# Patient Record
Sex: MALE | Race: WHITE
[De-identification: names, ages, dates, MRNs, and addresses within clinical notes are randomized per-mention and may not be internally consistent; named-entity substitution may affect disease eponyms.]

---

## 2024-01-02 ENCOUNTER — APPOINTMENT (OUTPATIENT)
Dept: ORTHOPEDIC SURGERY | Facility: CLINIC | Age: 24
End: 2024-01-02
Payer: COMMERCIAL

## 2024-01-02 PROBLEM — Z00.00 ENCOUNTER FOR PREVENTIVE HEALTH EXAMINATION: Status: ACTIVE | Noted: 2024-01-02

## 2024-01-02 PROCEDURE — 99203 OFFICE O/P NEW LOW 30 MIN: CPT

## 2024-01-02 PROCEDURE — 73130 X-RAY EXAM OF HAND: CPT | Mod: LT

## 2024-01-02 NOTE — HISTORY OF PRESENT ILLNESS
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  Hx: none. [] : no [FreeTextEntry1] : LEFT hand  [FreeTextEntry5] : DANIEL capps [RHD] 23 year old male is here today c/o LEFT hand pain x 2 days after being struck by opponents elbow playing basketball. pain localized to base of index finger. denies prior injury to hand. c/o swelling and pain with making a fist.

## 2024-01-02 NOTE — IMAGING
[de-identified] : LEFT HAND skin intact. mild swelling of IF MPJ. TTP to IF radial MPJ. IF: good extension, flex 1cm to DPC. no scissoring. good flex/ext other digits. SILT to median, ulnar, radial distribution.  palpable radial pulse, brisk cap refill all digits. no triggering.  IF MPJ: + pain with flexion.   XRAYS OF LEFT HAND: small linear calcification at radial base of index finger proximal phalanx, may represent avulsion fracture.

## 2024-01-02 NOTE — ASSESSMENT
[FreeTextEntry1] : The condition was explained to the patient. Recommend a course of conservative treatment. I explained that sprains / ligament injuries can take up to a year for maximal healing. Discussed risk of persistent pain, stiffness, instability, post-traumatic arthritis. Anticipate 1 year for swelling to stabilize, may have permanent enlargement of joint. Patient expressed understanding and is in agreement with treatment plan.   - prescribed OT custom hand-based radial gutter splint, full time. applied joshua loop to IF/MF in the interim. - light activity - avoid pinching and heavy gripping/lifting.  F/u 3 weeks.

## 2024-01-23 ENCOUNTER — APPOINTMENT (OUTPATIENT)
Dept: ORTHOPEDIC SURGERY | Facility: CLINIC | Age: 24
End: 2024-01-23
Payer: COMMERCIAL

## 2024-01-23 PROCEDURE — 99213 OFFICE O/P EST LOW 20 MIN: CPT

## 2024-01-23 NOTE — ASSESSMENT
[FreeTextEntry1] : - wear joshua loop to IF/MF during the day, OT custom hand-based radial gutter splint for sleep. - encouraged HEP to make a fist to reduce stiffness. - light activity - avoid pinching and heavy gripping/lifting.  F/u 3 weeks.

## 2024-01-23 NOTE — IMAGING
[de-identified] : LEFT HAND skin intact. mild swelling of IF MPJ. non-TTP to IF radial MPJ. IF: good extension, flex 1cm to DPC. no scissoring. good flex/ext other digits. SILT to median, ulnar, radial distribution.  palpable radial pulse, brisk cap refill all digits. no triggering.  IF MPJ: + pain with flexion.

## 2024-01-23 NOTE — HISTORY OF PRESENT ILLNESS
[de-identified] : 1/23/24: f/u LEFT index finger radial MPJ sprain with avulsion fx. in OT custom hand-based radial gutter splint. pain better.  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  Hx: none. [FreeTextEntry1] : LEFT hand  [FreeTextEntry5] : DANIEL is here today to follow up on his LEFT hand. Patient has been using custom hand splint and notes improvement in pain since last visit.

## 2024-02-20 ENCOUNTER — APPOINTMENT (OUTPATIENT)
Dept: ORTHOPEDIC SURGERY | Facility: CLINIC | Age: 24
End: 2024-02-20
Payer: COMMERCIAL

## 2024-02-20 DIAGNOSIS — S63.651A SPRAIN OF METACARPOPHALANGEAL JOINT OF LEFT INDEX FINGER, INITIAL ENCOUNTER: ICD-10-CM

## 2024-02-20 DIAGNOSIS — S62.619A DISPLACED FRACTURE OF PROXIMAL PHALANX OF UNSPECIFIED FINGER, INITIAL ENCOUNTER FOR CLOSED FRACTURE: ICD-10-CM

## 2024-02-20 PROCEDURE — 99213 OFFICE O/P EST LOW 20 MIN: CPT

## 2024-02-20 NOTE — IMAGING
[de-identified] : LEFT HAND skin intact. mild swelling of IF MPJ. non-TTP to IF radial MPJ. IF: good extension, flex almost to DPC. no scissoring. good flex/ext other digits. SILT to median, ulnar, radial distribution.  palpable radial pulse, brisk cap refill all digits. no triggering.  IF MPJ: + min pain with flexion.

## 2024-02-20 NOTE — HISTORY OF PRESENT ILLNESS
[de-identified] : 2/20/24: 7 weeks s/p LEFT index finger radial MPJ sprain with avulsion fx from 12/31/23. in joshua loops during the day, OT custom radial gutter splint for sleep. pain better.  1/23/24: f/u LEFT index finger radial MPJ sprain with avulsion fx. in OT custom hand-based radial gutter splint. pain better.  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  Hx: none. [FreeTextEntry1] : LEFT hand  [FreeTextEntry5] : DANIEL is here today to follow up on his LEFT hand. pt states since last visit, pain decreased. wearing joshua loops in the day and custom splint at night.

## 2024-02-20 NOTE — ASSESSMENT
[FreeTextEntry1] : - wear buddy loop to IF/MF as needed for activity. - continue HEP to reduce finger stiffness. - gradually advance activity as pain allows. recommend waiting 3 months from DOI to resume sports.  F/u PRN.

## 2024-08-07 ENCOUNTER — APPOINTMENT (OUTPATIENT)
Dept: ORTHOPEDIC SURGERY | Facility: CLINIC | Age: 24
End: 2024-08-07

## 2024-08-07 PROBLEM — S43.52XA SPRAIN OF LEFT ACROMIOCLAVICULAR LIGAMENT, INITIAL ENCOUNTER: Status: ACTIVE | Noted: 2024-08-07

## 2024-08-07 PROBLEM — Z78.9 NO PERTINENT PAST MEDICAL HISTORY: Status: RESOLVED | Noted: 2024-08-07 | Resolved: 2024-08-07

## 2024-08-07 PROCEDURE — 73030 X-RAY EXAM OF SHOULDER: CPT | Mod: LT

## 2024-08-07 PROCEDURE — 99203 OFFICE O/P NEW LOW 30 MIN: CPT

## 2024-08-07 PROCEDURE — 73010 X-RAY EXAM OF SHOULDER BLADE: CPT | Mod: LT

## 2024-08-07 NOTE — IMAGING
[Left] : left shoulder [There are no fractures, subluxations or dislocations. No significant abnormalities are seen] : There are no fractures, subluxations or dislocations. No significant abnormalities are seen [de-identified] : L shoulder: - No obvious deformity or swelling - Pain over AC joint - No pain with palpation over anterior or posterior shoulder - Forward flexion to 180 degrees, External rotation to 20 degrees, Internal rotation to T12. Pain with max range - 5/5 strength with internal and external rotation. Pain with external rotation - Negative Empty can - Pain with impingement testing - Pain with Speeds - Pain with Cross arm - Distally neurovascularly intact     R shoulder: - No obvious deformity or swelling - No pain with palpation over AC joint, anterior or posterior shoulder - Forward flexion to 180 degrees, External rotation to 30 degrees, Internal rotation to T12 - 5/5 strength with internal and external rotation - Negative Empty can - Negative Speeds - Distally neurovascularly intact

## 2024-08-07 NOTE — IMAGING
[Left] : left shoulder [There are no fractures, subluxations or dislocations. No significant abnormalities are seen] : There are no fractures, subluxations or dislocations. No significant abnormalities are seen [de-identified] : L shoulder: - No obvious deformity or swelling - Pain over AC joint - No pain with palpation over anterior or posterior shoulder - Forward flexion to 180 degrees, External rotation to 20 degrees, Internal rotation to T12. Pain with max range - 5/5 strength with internal and external rotation. Pain with external rotation - Negative Empty can - Pain with impingement testing - Pain with Speeds - Pain with Cross arm - Distally neurovascularly intact     R shoulder: - No obvious deformity or swelling - No pain with palpation over AC joint, anterior or posterior shoulder - Forward flexion to 180 degrees, External rotation to 30 degrees, Internal rotation to T12 - 5/5 strength with internal and external rotation - Negative Empty can - Negative Speeds - Distally neurovascularly intact

## 2024-08-07 NOTE — ASSESSMENT
[FreeTextEntry1] : - HEP given - PT referral - Exercise modifications - Motrin, tylenol, ice as needed - Follow up in 4-6 weeks if not improving

## 2024-08-07 NOTE — HISTORY OF PRESENT ILLNESS
[5] : 5 [0] : 0 [Sharp] : sharp [Intermittent] : intermittent [Rest] : rest [Exercising] : exercising [de-identified] : 08/07/2024:  RUBÉN 24y.o m. patient is here for evaluation of left shoulder pain. RHD. 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 he states that the pain has been present for the last 2 days, he is unable to lift his arm over a 90 angle and having problems with ROM.  denies OTC, denies numbness/tingling as well as radiating pain. no pain with rest. No hx of shoulder problems.  pressing and rowing. 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

## 2024-08-07 NOTE — HISTORY OF PRESENT ILLNESS
[5] : 5 [0] : 0 [Sharp] : sharp [Intermittent] : intermittent [Rest] : rest [Exercising] : exercising [de-identified] : 08/07/2024:  RUBÉN 24y.o m. patient is here for evaluation of left shoulder pain. RHD. 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 he states that the pain has been present for the last 2 days, he is unable to lift his arm over a 90 angle and having problems with ROM.  denies OTC, denies numbness/tingling as well as radiating pain. no pain with rest. No hx of shoulder problems.  pressing and rowing. 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

## 2024-09-10 ENCOUNTER — APPOINTMENT (OUTPATIENT)
Dept: ORTHOPEDIC SURGERY | Facility: CLINIC | Age: 24
End: 2024-09-10

## 2025-06-02 ENCOUNTER — APPOINTMENT (OUTPATIENT)
Dept: ORTHOPEDIC SURGERY | Facility: CLINIC | Age: 25
End: 2025-06-02
Payer: COMMERCIAL

## 2025-06-02 DIAGNOSIS — Z00.00 ENCOUNTER FOR GENERAL ADULT MEDICAL EXAMINATION W/OUT ABNORMAL FINDINGS: ICD-10-CM

## 2025-06-02 DIAGNOSIS — S86.112A STRAIN OF OTHER MUSCLE(S) AND TENDON(S) OF POSTERIOR MUSCLE GROUP AT LOWER LEG LEVEL, LEFT LEG, INITIAL ENCOUNTER: ICD-10-CM

## 2025-06-02 PROCEDURE — 73590 X-RAY EXAM OF LOWER LEG: CPT | Mod: LT

## 2025-06-02 PROCEDURE — 99204 OFFICE O/P NEW MOD 45 MIN: CPT

## 2025-06-02 RX ORDER — MELOXICAM 15 MG/1
15 TABLET ORAL
Qty: 30 | Refills: 2 | Status: ACTIVE | COMMUNITY
Start: 2025-06-02 | End: 1900-01-01

## 2025-06-23 ENCOUNTER — APPOINTMENT (OUTPATIENT)
Dept: ORTHOPEDIC SURGERY | Facility: CLINIC | Age: 25
End: 2025-06-23